# Patient Record
Sex: MALE | Race: ASIAN | NOT HISPANIC OR LATINO | URBAN - METROPOLITAN AREA
[De-identification: names, ages, dates, MRNs, and addresses within clinical notes are randomized per-mention and may not be internally consistent; named-entity substitution may affect disease eponyms.]

---

## 2022-05-28 ENCOUNTER — EMERGENCY (EMERGENCY)
Facility: HOSPITAL | Age: 57
LOS: 1 days | Discharge: HOME | End: 2022-05-28
Attending: EMERGENCY MEDICINE
Payer: MEDICAID

## 2022-05-28 VITALS
HEART RATE: 68 BPM | OXYGEN SATURATION: 98 % | WEIGHT: 128.09 LBS | SYSTOLIC BLOOD PRESSURE: 129 MMHG | DIASTOLIC BLOOD PRESSURE: 76 MMHG | RESPIRATION RATE: 18 BRPM | HEIGHT: 64 IN | TEMPERATURE: 98 F

## 2022-05-28 PROCEDURE — 99283 EMERGENCY DEPT VISIT LOW MDM: CPT

## 2022-05-28 PROCEDURE — 99284 EMERGENCY DEPT VISIT MOD MDM: CPT

## 2022-05-28 PROCEDURE — 93005 ELECTROCARDIOGRAM TRACING: CPT

## 2022-05-28 RX ORDER — IBUPROFEN 200 MG
1 TABLET ORAL
Qty: 30 | Refills: 0
Start: 2022-05-28

## 2022-05-28 RX ORDER — IBUPROFEN 200 MG
600 TABLET ORAL ONCE
Refills: 0 | Status: COMPLETED | OUTPATIENT
Start: 2022-05-28 | End: 2022-05-28

## 2022-05-28 RX ORDER — DIAZEPAM 5 MG
1 TABLET ORAL
Qty: 5 | Refills: 0
Start: 2022-05-28 | End: 2022-06-01

## 2022-05-28 RX ORDER — DIAZEPAM 5 MG
5 TABLET ORAL ONCE
Refills: 0 | Status: DISCONTINUED | OUTPATIENT
Start: 2022-05-28 | End: 2022-05-28

## 2022-05-28 RX ADMIN — Medication 5 MILLIGRAM(S): at 17:56

## 2022-05-28 RX ADMIN — Medication 600 MILLIGRAM(S): at 17:44

## 2022-05-28 NOTE — ED ADULT NURSE NOTE - PAIN RATING/NUMBER SCALE (0-10): ACTIVITY
Anesthesia Evaluation     Patient summary reviewed and Nursing notes reviewed   no history of anesthetic complications:  NPO Solid Status: > 6 hours  NPO Liquid Status: > 6 hours           Airway   Mallampati: II  TM distance: >3 FB  Neck ROM: full  no difficulty expected and No difficulty expected  Dental - normal exam     Pulmonary - negative pulmonary ROS and normal exam    breath sounds clear to auscultation  (-) rhonchi, decreased breath sounds, wheezes, rales, stridor  Cardiovascular - normal exam    NYHA Classification: I  ECG reviewed  Rhythm: regular  Rate: normal    (+) hypertension,   (-) murmur, weak pulses, friction rub, systolic click, carotid bruits, JVD, peripheral edema      Neuro/Psych- negative ROS  GI/Hepatic/Renal/Endo    (+)  GERD poorly controlled,      Musculoskeletal (-) negative ROS    Abdominal  - normal exam    Abdomen: soft.   Substance History - negative use     OB/GYN negative ob/gyn ROS         Other - negative ROS                       Anesthesia Plan    ASA 2     MAC     intravenous induction     Anesthetic plan, all risks, benefits, and alternatives have been provided, discussed and informed consent has been obtained with: patient.      
6

## 2022-05-28 NOTE — ED PROVIDER NOTE - PATIENT PORTAL LINK FT
You can access the FollowMyHealth Patient Portal offered by [facility] by registering at the following website: [email or web address]. By joining [employer] [facility]’s FollowMyHealth portal, you will also be able to view your health information using other applications (apps) compatible with our system.

## 2022-05-28 NOTE — ED PROVIDER NOTE - CLINICAL SUMMARY MEDICAL DECISION MAKING FREE TEXT BOX
56 yo M with neck pain, suspect MSK. Not meningitis. Plan - meds, dc supportive care. Discussed indications for patient return to ED. Patient understood.

## 2022-05-28 NOTE — ED PROVIDER NOTE - OBJECTIVE STATEMENT
56 yo M pmh of HTN presents with R sided neck pain x a few days. Taking tylenol w/o relief. Pain worse with palpation. Denies fever, neck stiffness, trauma, ear pain, other acute complaints.

## 2022-05-28 NOTE — ED PROVIDER NOTE - NS ED ROS FT
CONSTITUTIONAL: no fever  EYES: no eye pain   ENMT: no throat pain  CARDIOVASCULAR: no chest pain   RESPIRATORY: no shortness of breath   GASTROINTESTINAL: no abdominal pain   GENITOURINARY: no dysuria   SKIN: no rashes  NEUROLOGICAL: no headache  MSK: + neck pain

## 2022-05-28 NOTE — ED ADULT NURSE NOTE - OBJECTIVE STATEMENT
Patient came to the ED a/o x 3 ambulates c/o pain in the back of the R side of the ear x 1 week radiating to the R side of the neck. Pt denies any shoulder pain. + slight chest tightness.

## 2022-05-28 NOTE — ED ADULT TRIAGE NOTE - CHIEF COMPLAINT QUOTE
sharp pain in the back of right ear going down to neck x 6 days sharp pain in the back of right ear going down to neck x 6 days,as per pt yesterday I had a pain on the left side of chest

## 2022-05-28 NOTE — ED PROVIDER NOTE - PHYSICAL EXAMINATION
GENERAL: well appearing, no acute distress   HEAD: atraumatic   EYES: EOMI   ENT: moist oral mucosa   CARDIAC: regular rate  RESPIRATORY: no increased work of breathing   MUSCULOSKELETAL: no deformity; R upper cervical paraspinal and occipital ttp   NEUROLOGICAL: alert, spontaneous movement of extremities   SKIN: no visible rash  PSYCHIATRIC: cooperative

## 2022-05-28 NOTE — ED ADULT NURSE NOTE - CHIEF COMPLAINT QUOTE
sharp pain in the back of right ear going down to neck x 6 days,as per pt yesterday I had a pain on the left side of chest